# Patient Record
Sex: MALE | Race: WHITE | Employment: FULL TIME | URBAN - METROPOLITAN AREA
[De-identification: names, ages, dates, MRNs, and addresses within clinical notes are randomized per-mention and may not be internally consistent; named-entity substitution may affect disease eponyms.]

---

## 2018-11-04 ENCOUNTER — HOSPITAL ENCOUNTER (EMERGENCY)
Age: 28
Discharge: LEFT BEFORE DISCHARGE | End: 2018-11-04

## 2018-11-04 VITALS
BODY MASS INDEX: 26.6 KG/M2 | WEIGHT: 190 LBS | HEIGHT: 71 IN | TEMPERATURE: 98.2 F | SYSTOLIC BLOOD PRESSURE: 118 MMHG | DIASTOLIC BLOOD PRESSURE: 63 MMHG | OXYGEN SATURATION: 99 % | HEART RATE: 93 BPM | RESPIRATION RATE: 14 BRPM

## 2021-02-04 ENCOUNTER — APPOINTMENT (OUTPATIENT)
Dept: GENERAL RADIOLOGY | Facility: CLINIC | Age: 31
End: 2021-02-04

## 2021-02-04 ENCOUNTER — HOSPITAL ENCOUNTER (EMERGENCY)
Facility: CLINIC | Age: 31
Discharge: HOME | End: 2021-02-04
Attending: EMERGENCY MEDICINE

## 2021-02-04 VITALS
TEMPERATURE: 97.9 F | RESPIRATION RATE: 14 BRPM | HEART RATE: 80 BPM | SYSTOLIC BLOOD PRESSURE: 130 MMHG | HEIGHT: 71 IN | WEIGHT: 200 LBS | BODY MASS INDEX: 28 KG/M2 | OXYGEN SATURATION: 98 % | DIASTOLIC BLOOD PRESSURE: 88 MMHG

## 2021-02-04 DIAGNOSIS — M79.605 LEFT LEG PAIN: Primary | ICD-10-CM

## 2021-02-04 PROCEDURE — 73590 X-RAY EXAM OF LOWER LEG: CPT

## 2021-02-04 PROCEDURE — 6370000000 HC RX 637 (ALT 250 FOR IP): Performed by: EMERGENCY MEDICINE

## 2021-02-04 PROCEDURE — 99284 EMERGENCY DEPT VISIT MOD MDM: CPT

## 2021-02-04 RX ORDER — IBUPROFEN 800 MG/1
800 TABLET ORAL ONCE
Status: COMPLETED | OUTPATIENT
Start: 2021-02-04 | End: 2021-02-04

## 2021-02-04 RX ORDER — IBUPROFEN 800 MG/1
800 TABLET ORAL EVERY 8 HOURS PRN
Qty: 30 TABLET | Refills: 0 | Status: SHIPPED | OUTPATIENT
Start: 2021-02-04

## 2021-02-04 RX ADMIN — IBUPROFEN 800 MG: 800 TABLET, FILM COATED ORAL at 03:53

## 2021-02-04 ASSESSMENT — PAIN DESCRIPTION - FREQUENCY: FREQUENCY: INTERMITTENT

## 2021-02-04 ASSESSMENT — PAIN SCALES - GENERAL: PAINLEVEL_OUTOF10: 7

## 2021-02-04 ASSESSMENT — PAIN DESCRIPTION - LOCATION: LOCATION: LEG

## 2021-02-04 NOTE — ED PROVIDER NOTES
eMERGENCY dEPARTMENT eNCOUnter      Pt Name: Pina Castano  MRN: [id number]  [place] 1990  Date of evaluation: 2/4/2021      CHIEF COMPLAINT       Chief Complaint   Patient presents with    Leg Pain     left         HISTORY OF PRESENT ILLNESS    Pina Castano is a 26 y.o. male who presents leg pain. Patient states approximately 2-1/2 months ago some and stepped on the anterior shin of his left lower leg he states he has been having pain since that time says he is having pain on the lateral aspect for the last 1-1/2 weeks he is here today to figure out what is going on he has not taken anything for his pain he is ambulating without difficulty        REVIEW OF SYSTEMS       Positive for leg pain negative for numbness tingling or weakness    PAST MEDICAL HISTORY    has no past medical history on file. SURGICAL HISTORY      has no past surgical history on file. CURRENT MEDICATIONS       Discharge Medication List as of 2/4/2021  3:49 AM          ALLERGIES     has No Known Allergies. FAMILY HISTORY     has no family status information on file. family history is not on file. SOCIAL HISTORY      reports that he has been smoking cigarettes. He has a 3.50 pack-year smoking history. He has never used smokeless tobacco. He reports that he does not drink alcohol or use drugs. PHYSICAL EXAM     INITIAL VITALS:  height is 5' 11\" (1.803 m) and weight is 90.7 kg (200 lb). His oral temperature is 97.9 °F (36.6 °C). His blood pressure is 130/88 and his pulse is 80. His respiration is 14 and oxygen saturation is 98%.       General: Patient is alert nontoxic-appearing male in no apparent distress  HEENT: Head is atraumatic  Respiratory: Patient has nonlabored respirations  Extremity: Examination left lower extremity reveals no gross deformity swelling or ecchymosis he has minimal reproducible pain in the muscular area specially in the lateral aspect of the calf no calf tenderness posterior no cords negative Homans' sign no swelling good distal pulses no erythema or warmth    DIFFERENTIAL DIAGNOSIS/ MDM:     Leg pain will obtain x-ray    DIAGNOSTIC RESULTS       RADIOLOGY:   I directly visualized the following  images and reviewed the radiologist interpretations:  XR TIBIA FIBULA LEFT (2 VIEWS)   Final Result   No acute osseous or soft tissue abnormality. LABS:  Labs Reviewed - No data to display      EMERGENCY DEPARTMENT COURSE:   Vitals:    Vitals:    02/04/21 0320   BP: 130/88   Pulse: 80   Resp: 14   Temp: 97.9 °F (36.6 °C)   TempSrc: Oral   SpO2: 98%   Weight: 90.7 kg (200 lb)   Height: 5' 11\" (1.803 m)     -------------------------  BP: 130/88, Temp: 97.9 °F (36.6 °C), Pulse: 80, Resp: 14    Orders Placed This Encounter   Medications    ibuprofen (ADVIL;MOTRIN) tablet 800 mg    ibuprofen (ADVIL;MOTRIN) 800 MG tablet     Sig: Take 1 tablet by mouth every 8 hours as needed for Pain     Dispense:  30 tablet     Refill:  0           Re-evaluation Notes    X-ray per radiologist shows nothing acute at this time there is no indications of acute bony abnormalities infectious process no indication DVT he will be treated with Motrin follow-up as directed return if worse        FINAL IMPRESSION      1.  Left leg pain          DISPOSITION/PLAN   DISPOSITION Decision To Discharge 02/04/2021 03:48:49 AM      Condition on Disposition    Stable    PATIENT REFERRED TO:  Chica Villegas MD  [facility]  [address]  [phone]      As needed      DISCHARGE MEDICATIONS:  Discharge Medication List as of 2/4/2021  3:49 AM      START taking these medications    Details   ibuprofen (ADVIL;MOTRIN) 800 MG tablet Take 1 tablet by mouth every 8 hours as needed for Pain, Disp-30 tablet, R-0Normal             (Please note that portions of this note were completed with a voice recognition program.  Efforts were made to edit the dictations but occasionally words are mis-transcribed.)    [place] David Dallas MD, F.A.C.E.P.   Attending Emergency Physician        Helena Chambers MD  02/04/21 8950

## 2021-02-04 NOTE — ED NOTES
Mode of arrival (squad #, walk in, police, etc) : walk in from home        Chief complaint(s): left leg pain        Arrival Note (brief scenario, treatment PTA, etc). : Pt presented to the ED c/o left leg pain for the past 2 months. Pt states his friend stepped on his leg at the time. Pt states he has just been trying to walk it off since then. Pt states the pain gotten progressively worse two weeks ago. Pt states that he is noticing more bruising on his left leg. Pt denies taking any pain medication at home. Pt able to ambulate with steady gait. C= \"Have you ever felt that you should Cut down on your drinking? \"  No  A= \"Have people Annoyed you by criticizing your drinking? \"  No  G= \"Have you ever felt bad or Guilty about your drinking? \"  No  E= \"Have you ever had a drink as an Eye-opener first thing in the morning to steady your nerves or to help a hangover? \"  No      Deferred []      Reason for deferring: N/A    *If yes to two or more: probable alcohol abuse. Arik Ocasio RN  02/04/21 1923

## 2021-10-25 ENCOUNTER — HOSPITAL ENCOUNTER (EMERGENCY)
Facility: CLINIC | Age: 31
Discharge: HOME | End: 2021-10-25
Attending: EMERGENCY MEDICINE

## 2021-10-25 VITALS
TEMPERATURE: 98 F | RESPIRATION RATE: 18 BRPM | OXYGEN SATURATION: 99 % | WEIGHT: 184 LBS | DIASTOLIC BLOOD PRESSURE: 95 MMHG | HEIGHT: 71 IN | BODY MASS INDEX: 25.76 KG/M2 | SYSTOLIC BLOOD PRESSURE: 152 MMHG | HEART RATE: 79 BPM

## 2021-10-25 DIAGNOSIS — L72.3 SEBACEOUS CYST: Primary | ICD-10-CM

## 2021-10-25 PROCEDURE — 99282 EMERGENCY DEPT VISIT SF MDM: CPT

## 2021-10-25 NOTE — ED PROVIDER NOTES
[address] ED  EMERGENCY DEPARTMENT ENCOUNTER      Pt Name: Zulmea Palomino  MRN: [id number]  [place] 1990  Date of evaluation: 10/25/2021  Provider: Pete Hawkins MD    CHIEF COMPLAINT     Chief Complaint   Patient presents with    Groin Swelling         HISTORY OF PRESENT ILLNESS   (Location/Symptom, Timing/Onset, Context/Setting,Quality, Duration, Modifying Factors, Severity)  Note limiting factors. Zulema Palomino is a 33 y.o. male who presents to the emergency department stating he noticed a lump on the shaft of his penis couple weeks ago. Earlier than that he had another lump that became bigger then had a discharge and then pretty much resolved. He denies urethral discharge or dysuria. The history is provided by the patient. Nursing Notes werereviewed. REVIEW OF SYSTEMS    (2-9 systems for level 4, 10 or more for level 5)     Review of Systems   All other systems reviewed and are negative. Except as noted above the remainder of the review of systems was reviewed and negative. PAST MEDICAL HISTORY   No past medical history on file. SURGICALHISTORY     No past surgical history on file. CURRENT MEDICATIONS       Discharge Medication List as of 10/25/2021  8:24 AM      CONTINUE these medications which have NOT CHANGED    Details   ibuprofen (ADVIL;MOTRIN) 800 MG tablet Take 1 tablet by mouth every 8 hours as needed for Pain, Disp-30 tablet, R-0Normal             ALLERGIES     Patient has no known allergies. FAMILY HISTORY     No family history on file.        SOCIAL HISTORY       Social History     Socioeconomic History    Marital status: Single     Spouse name: Not on file    Number of children: Not on file    Years of education: Not on file    Highest education level: Not on file   Occupational History    Not on file   Tobacco Use    Smoking status: Current Every Day Smoker     Packs/day: 0.50     Years: 7.00     Pack years: 3.50     Types: Cigarettes    Smokeless tobacco: Never Used   Vaping Use    Vaping Use: Never used   Substance and Sexual Activity    Alcohol use: No    Drug use: No    Sexual activity: Yes   Other Topics Concern    Not on file   Social History Narrative    Not on file     Social Determinants of Health     Financial Resource Strain:     Difficulty of Paying Living Expenses:    Food Insecurity:     Worried About Running Out of Food in the Last Year:     [address] in the Last Year:    Transportation Needs:     Lack of Transportation (Medical):  Lack of Transportation (Non-Medical):    Physical Activity:     Days of Exercise per Week:     Minutes of Exercise per Session:    Stress:     Feeling of Stress :    Social Connections:     Frequency of Communication with Friends and Family:     Frequency of Social Gatherings with Friends and Family:     Attends [employer] Services:     Active Member of Clubs or Organizations:     Attends Club or Organization Meetings:     Marital Status:    Intimate Partner Violence:     Fear of Current or Ex-Partner:     Emotionally Abused:     Physically Abused:     Sexually Abused:        SCREENINGS             PHYSICAL EXAM    (up to 7 for level 4, 8 or more for level 5)     ED Triage Vitals   BP Temp Temp Source Pulse Resp SpO2 Height Weight   10/25/21 0802 10/25/21 0804 10/25/21 0804 10/25/21 0802 10/25/21 0802 10/25/21 0802 10/25/21 0802 10/25/21 0802   (!) 152/95 98 °F (36.7 °C) Oral 79 18 99 % 5' 11\" (1.803 m) 184 lb (83.5 kg)       Physical Exam  Vitals reviewed. Constitutional:       General: He is not in acute distress. Genitourinary:     Comments: Patient does not have groin adenopathy. On the ventral surface of the penile shaft pretty much in the midline there is a small sebaceous cyst with a central punctum. This is barely half centimeter in diameter and is not tender or fluctuant. There is no overlying skin redness or warmth.   There is no urethral discharge. The rest of the external genitalia are unremarkable and nontender. Skin:     General: Skin is warm and dry. Neurological:      Mental Status: He is alert. DIAGNOSTIC RESULTS     EKG: All EKG's are interpreted by the Emergency Department Physician who either signs orCo-signs this chart in the absence of a cardiologist.    RADIOLOGY:     Interpretation per the Radiologist below, ifavailable at the time of this note:    No orders to display         ED BEDSIDE ULTRASOUND:   Performed by ED Physician - none    LABS:  Labs Reviewed - No data to display    All other labs were within normal range ornot returned as of this dictation. EMERGENCY DEPARTMENT COURSE and DIFFERENTIAL DIAGNOSIS/MDM:   Vitals:    Vitals:    10/25/21 0802 10/25/21 0804   BP: (!) 152/95    Pulse: 79    Resp: 18    Temp:  98 °F (36.7 °C)   TempSrc:  Oral   SpO2: 99%    Weight: 83.5 kg (184 lb)    Height: 5' 11\" (1.803 m)             Patient has required a lot of reassurance about the benign nature of this condition. He was advised to seek urology evaluation if it becomes larger or starts looking infected. MDM    CONSULTS:  None    PROCEDURES:  Unlessotherwise noted below, none     Procedures    FINAL IMPRESSION      1. Sebaceous cyst          DISPOSITION/PLAN   DISPOSITION Decision To Discharge 10/25/2021 08:23:50 AM      PATIENT REFERRED TO:  [id number] [employer] [address] [place] [id number]    As needed      DISCHARGE MEDICATIONS:         Problem List:  There is no problem list on file for this patient. Summation      Patient Course: Discharged. ED Medicationsadministered this visit:  Medications - No data to display    New Prescriptions from this visit:    Discharge Medication List as of 10/25/2021  8:24 AM          Follow-up:  [id number] [employer] [address] [place] [phone]    As needed        Final Impression:   1.  Sebaceous cyst               (Please note that portions of this note were completed with a voice recognitionprogram.  Efforts were made to edit the dictations but occasionally words are mis-transcribed.)    Monica Wang MD (electronically signed)  Attending Emergency Physician            Monica Wang MD  10/25/21 0174

## 2022-01-12 ENCOUNTER — HOSPITAL ENCOUNTER (EMERGENCY)
Age: 32
Discharge: HOME | End: 2022-01-12
Attending: EMERGENCY MEDICINE

## 2022-01-12 ENCOUNTER — HOSPITAL ENCOUNTER (EMERGENCY)
Facility: CLINIC | Age: 32
Discharge: ANOTHER HOSPITAL | End: 2022-01-12
Attending: EMERGENCY MEDICINE

## 2022-01-12 VITALS
RESPIRATION RATE: 18 BRPM | HEART RATE: 92 BPM | OXYGEN SATURATION: 98 % | TEMPERATURE: 98.4 F | WEIGHT: 200 LBS | SYSTOLIC BLOOD PRESSURE: 145 MMHG | BODY MASS INDEX: 28 KG/M2 | DIASTOLIC BLOOD PRESSURE: 98 MMHG | HEIGHT: 71 IN

## 2022-01-12 VITALS
HEART RATE: 80 BPM | HEIGHT: 71 IN | OXYGEN SATURATION: 98 % | DIASTOLIC BLOOD PRESSURE: 88 MMHG | RESPIRATION RATE: 18 BRPM | TEMPERATURE: 98.5 F | WEIGHT: 200 LBS | BODY MASS INDEX: 28 KG/M2 | SYSTOLIC BLOOD PRESSURE: 120 MMHG

## 2022-01-12 DIAGNOSIS — L02.91 ABSCESS: Primary | ICD-10-CM

## 2022-01-12 DIAGNOSIS — N48.21 ABSCESS OF SHAFT OF PENIS: Primary | ICD-10-CM

## 2022-01-12 PROCEDURE — 6370000000 HC RX 637 (ALT 250 FOR IP): Performed by: EMERGENCY MEDICINE

## 2022-01-12 PROCEDURE — 6370000000 HC RX 637 (ALT 250 FOR IP): Performed by: NURSE PRACTITIONER

## 2022-01-12 PROCEDURE — 99285 EMERGENCY DEPT VISIT HI MDM: CPT

## 2022-01-12 PROCEDURE — 2500000003 HC RX 250 WO HCPCS: Performed by: PHYSICIAN ASSISTANT

## 2022-01-12 PROCEDURE — 99283 EMERGENCY DEPT VISIT LOW MDM: CPT

## 2022-01-12 PROCEDURE — 10060 I&D ABSCESS SIMPLE/SINGLE: CPT

## 2022-01-12 RX ORDER — IBUPROFEN 800 MG/1
800 TABLET ORAL ONCE
Status: COMPLETED | OUTPATIENT
Start: 2022-01-12 | End: 2022-01-12

## 2022-01-12 RX ORDER — LIDOCAINE HYDROCHLORIDE 10 MG/ML
INJECTION, SOLUTION INFILTRATION; PERINEURAL
Status: DISCONTINUED
Start: 2022-01-12 | End: 2022-01-12 | Stop reason: HOSPADM

## 2022-01-12 RX ORDER — OXYCODONE HYDROCHLORIDE AND ACETAMINOPHEN 5; 325 MG/1; MG/1
2 TABLET ORAL ONCE
Status: COMPLETED | OUTPATIENT
Start: 2022-01-12 | End: 2022-01-12

## 2022-01-12 RX ORDER — DOXYCYCLINE HYCLATE 100 MG
100 TABLET ORAL ONCE
Status: DISCONTINUED | OUTPATIENT
Start: 2022-01-12 | End: 2022-01-12 | Stop reason: CLARIF

## 2022-01-12 RX ORDER — DOXYCYCLINE HYCLATE 100 MG
100 TABLET ORAL 2 TIMES DAILY
Qty: 20 TABLET | Refills: 0 | Status: SHIPPED | OUTPATIENT
Start: 2022-01-12 | End: 2022-01-22

## 2022-01-12 RX ORDER — DOXYCYCLINE 100 MG/1
100 CAPSULE ORAL ONCE
Status: COMPLETED | OUTPATIENT
Start: 2022-01-12 | End: 2022-01-12

## 2022-01-12 RX ORDER — OXYCODONE HYDROCHLORIDE AND ACETAMINOPHEN 5; 325 MG/1; MG/1
1-2 TABLET ORAL EVERY 6 HOURS PRN
Qty: 10 TABLET | Refills: 0 | Status: SHIPPED | OUTPATIENT
Start: 2022-01-12 | End: 2022-01-15

## 2022-01-12 RX ORDER — LIDOCAINE HYDROCHLORIDE 10 MG/ML
5 INJECTION, SOLUTION EPIDURAL; INFILTRATION; INTRACAUDAL; PERINEURAL ONCE
Status: COMPLETED | OUTPATIENT
Start: 2022-01-12 | End: 2022-01-12

## 2022-01-12 RX ADMIN — OXYCODONE HYDROCHLORIDE AND ACETAMINOPHEN 2 TABLET: 5; 325 TABLET ORAL at 08:24

## 2022-01-12 RX ADMIN — DOXYCYCLINE 100 MG: 100 CAPSULE ORAL at 06:08

## 2022-01-12 RX ADMIN — IBUPROFEN 800 MG: 800 TABLET, FILM COATED ORAL at 06:08

## 2022-01-12 RX ADMIN — LIDOCAINE HYDROCHLORIDE 5 ML: 10 INJECTION, SOLUTION EPIDURAL; INFILTRATION; INTRACAUDAL; PERINEURAL at 09:40

## 2022-01-12 ASSESSMENT — ENCOUNTER SYMPTOMS
SHORTNESS OF BREATH: 0
COUGH: 0

## 2022-01-12 ASSESSMENT — PAIN SCALES - GENERAL
PAINLEVEL_OUTOF10: 8
PAINLEVEL_OUTOF10: 9
PAINLEVEL_OUTOF10: 5
PAINLEVEL_OUTOF10: 10

## 2022-01-12 ASSESSMENT — PAIN DESCRIPTION - LOCATION: LOCATION: PENIS

## 2022-01-12 ASSESSMENT — PAIN DESCRIPTION - DESCRIPTORS: DESCRIPTORS: ACHING

## 2022-01-12 ASSESSMENT — PAIN DESCRIPTION - PAIN TYPE: TYPE: ACUTE PAIN

## 2022-01-12 NOTE — ED PROVIDER NOTES
[facility]  [facility]  Phone: [phone]      Pt Name: Samuel Johnson  QKP:[id number]  [place] 1990  Date of evaluation: 1/12/2022      CHIEF COMPLAINT       Chief Complaint   Patient presents with    Abscess     groin       HISTORY OF PRESENT ILLNESS   Samuel Johnson is a 33 y.o. male who presents for evaluation of a painful lesion to the shaft of his penis. The patient reports that starting approximately 3 months ago he noticed a small, nonpainful, bump on the underside shaft of his penis. He states that he was evaluated here in the emergency department on 10/25/2021 and at that time it was thought to be a sebaceous cyst.  He was instructed to apply warm compresses and allow the cyst to drain naturally. The patient has been applying warm compresses and states that at times he has drained a purulent white material but over the past week his it seemed to get bigger and bigger with associated pain, redness, and swelling. The patient states that it is no longer draining. He has not followed up with a PCP because he does not currently have insurance. He does not have an established urologist.  His tetanus shot is up-to-date. The patient has not taken any medications for pain and does not list any palliating factors. His pain is worse with movement. The patient denies fever, chills, headache, vision changes, neck pain, back pain, chest pain, shortness of breath, abdominal pain, urinary/bowel symptoms, penile drainage, testicular pain, focal weakness, numbness, tingling, recent injury or illness. REVIEW OF SYSTEMS     Ten point review of systems was reviewed and is negative unless otherwise noted in the HPI    [address]    has no past medical history on file. The patient denies    SURGICAL HISTORY      has no past surgical history on file.   The patient denies    CURRENT MEDICATIONS       Previous Medications    IBUPROFEN (ADVIL;MOTRIN) 800 MG TABLET    Take 1 tablet by mouth every 8 hours as needed for Pain       ALLERGIES     has No Known Allergies. FAMILY HISTORY     has no family status information on file. family history is not on file. SOCIAL HISTORY      reports that he has been smoking cigarettes. He has a 3.50 pack-year smoking history. He has never used smokeless tobacco. He reports that he does not drink alcohol and does not use drugs. PHYSICAL EXAM     INITIAL VITALS:  height is 5' 11\" (1.803 m) and weight is 90.7 kg (200 lb). His oral temperature is 98.4 °F (36.9 °C). His blood pressure is 145/98 (abnormal) and his pulse is 92. His respiration is 18 and oxygen saturation is 98%. CONSTITUTIONAL: no apparent distress, well appearing  SKIN: There is an approximately 2 cm x 2 cm indurated, fluctuant, tender, erythematous abscess to the ventral shaft of the penis without any lymphangitic streaking or active drainage. No lymphadenopathy. Skin otherwise warm, dry, no jaundice, hives or petechiae  EYES: clear conjunctiva, non-icteric sclera  HENT: normocephalic, atraumatic, moist mucus membranes  NECK: Nontender and supple with no nuchal rigidity, full range of motion  PULMONARY: clear to auscultation without wheezes, rhonchi, or rales, normal excursion, no accessory muscle use and no stridor  CARDIOVASCULAR: regular rate, rhythm. Strong radial pulses with intact distal perfusion. Capillary refill <2 seconds. GASTROINTESTINAL: soft, non-tender, non-distended, no palpable masses, no rebound or guarding   GENITOURINARY: No costovertebral angle tenderness to palpation. External genitalia normal, no urethral discharge, tested descended bilaterally. No lesions noted on the scrotum. Epididymus normal bilaterally. No hernia palpable. MUSCULOSKELETAL: No midline spinal tenderness, step off or deformity. Extremities are otherwise nontender to palpation and nonerythematous. Compartments soft. No peripheral edema.   NEUROLOGIC: alert and oriented x 3, GCS 15, normal mentation and speech. Moves all extremities x 4 without motor or sensory deficit, gait is stable without ataxia  PSYCHIATRIC: normal mood and affect, thought process is clear and linear    DIAGNOSTIC RESULTS     EKG:  None    RADIOLOGY:   No results found. LABS:  No results found for this visit on 01/12/22. EMERGENCY DEPARTMENT COURSE:        The patient was given the following medications:  Orders Placed This Encounter   Medications    DISCONTD: doxycycline hyclate (VIBRA-TABS) tablet 100 mg     Order Specific Question:   Antimicrobial Indications     Answer:   Skin and Soft Tissue Infection    ibuprofen (ADVIL;MOTRIN) tablet 800 mg    doxycycline monohydrate (MONODOX) capsule 100 mg     Order Specific Question:   Antimicrobial Indications     Answer:   Skin and Soft Tissue Infection        Vitals:    Vitals:    01/12/22 0541 01/12/22 0653   BP: (!) 145/98    Pulse: 101 92   Resp: 18    Temp: 98.4 °F (36.9 °C)    TempSrc: Oral    SpO2: 98%    Weight: 90.7 kg (200 lb)    Height: 5' 11\" (1.803 m)      -------------------------  BP: (!) 145/98, Temp: 98.4 °F (36.9 °C), Pulse: 92, Resp: 18    CONSULTS:  None    CRITICAL CARE:   None    PROCEDURES:  None    DIAGNOSIS/ MDM:   Paulino Severs is a 33 y.o. male who presents with a painful abscess to the shaft of his penis. Based on chart review and patient history he likely had a painless cyst on the shaft of his penis for months before it recently became infected. The abscesses erythematous, indurated, fluctuant and tender. There is no active drainage. Initial heart rate was slightly elevated but I suspect this secondary to pain and anxiety. He was treated with ibuprofen and heart rate improved to normal.  Vital signs are otherwise stable. I did start him on doxycycline while waiting to speak with urology on-call.   I spoke to Dr. Caitlin Ley at [facility] and he suspects that the abscess has embedded into underlying structures and he will need urologic surgery to have this removed. He recommended that the patient be transferred over to [facility] emergency department by private auto and have the urology team evaluate him. The patient is otherwise hemodynamically stable. He is agreeable with plan. I spoke to the emergency physician at [facility], Dr. Meaghan Johnson, at 6:56am who agrees to accept the patient for transfer. The patient will go by private auto. FINAL IMPRESSION      1.  Abscess of shaft of penis          DISPOSITION/PLAN   DISPOSITION          PATIENT REFERRED TO:  [address] [facility]  [address]  [facility] [id number]  Go to   [address] [facility] [address]:  New Prescriptions    No medications on file       (Please note that portions of this note were completed with a voice recognitionprogram.  Efforts were made to edit the dictations but occasionally words are mis-transcribed.)    Tien Danielle DO, [facility]  Emergency Physician Attending         Tien Danielle DO  01/12/22 2391

## 2022-01-12 NOTE — CONSULTS
Ananya Christensen, [place] Beavers, Scotty Worley, & Guido  Urology Consultation      Patient:  Coni Vila  MRN: [id number]  YOB: 1990    CHIEF COMPLAINT:  Penile Abscess    HISTORY OF PRESENT ILLNESS:   The patient is a 33 y.o. male who presents with abscess that started as a cyst about 3 months ago. He states he reported to ED in October 2021 for a painless bump on his penile shaft and diagnosed with a cyst - no drainage and no antibiotics given at that time. He then states the spot never went away, but was not bothering him, so he did not follow up with urology as directed. He then reports that the lesion became painful over the last week or so, started draining pustular fluid, and he returned to [address] [place] [address],[address] ED this morning as it stopped draining and the pain worsened. He was told to transfer here for urologic evaluation. He was given one dose of doxycycline this morning. He has no medical history, denies any fevers, chills, or other similar abscesses on his body. He also denies any history of STDs, marijuana or other drug use. Patient's old records, notes and chart reviewed and summarized above. Past Medical History:    History reviewed. No pertinent past medical history. Past Surgical History:    History reviewed. No pertinent surgical history.     Medications:      Current Facility-Administered Medications:     lidocaine PF 1 % injection 5 mL, 5 mL, IntraDERmal, Once, Radha Billingsley PA-C    Current Outpatient Medications:     ibuprofen (ADVIL;MOTRIN) 800 MG tablet, Take 1 tablet by mouth every 8 hours as needed for Pain, Disp: 30 tablet, Rfl: 0    Allergies:    No Known Allergies    Social History:   Social History     Socioeconomic History    Marital status: Single     Spouse name: Not on file    Number of children: Not on file    Years of education: Not on file    Highest education level: Not on file   Occupational History    Not on file   Tobacco Use    Smoking status: Current Every Day Smoker     Packs/day: 0.50     Years: 7.00     Pack years: 3.50     Types: Cigarettes    Smokeless tobacco: Never Used   Vaping Use    Vaping Use: Never used   Substance and Sexual Activity    Alcohol use: No    Drug use: No    Sexual activity: Yes   Other Topics Concern    Not on file   Social History Narrative    Not on file     Social Determinants of Health     Financial Resource Strain:     Difficulty of Paying Living Expenses: Not on file   Food Insecurity:     Worried About Running Out of Food in the Last Year: Not on file    Jayda of Food in the Last Year: Not on file   Transportation Needs:     Lack of Transportation (Medical): Not on file    Lack of Transportation (Non-Medical): Not on file   Physical Activity:     Days of Exercise per Week: Not on file    Minutes of Exercise per Session: Not on file   Stress:     Feeling of Stress : Not on file   Social Connections:     Frequency of Communication with Friends and Family: Not on file    Frequency of Social Gatherings with Friends and Family: Not on file    Attends Bahai Services: Not on file    Active Member of [address] [place] [address] or Organizations: Not on file    Attends Club or Organization Meetings: Not on file    Marital Status: Not on file   Intimate Partner Violence:     Fear of Current or Ex-Partner: Not on file    Emotionally Abused: Not on file    Physically Abused: Not on file    Sexually Abused: Not on file   Housing Stability:     Unable to Pay for Housing in the Last Year: Not on file    Number of [place] in the Last Year: Not on file    Unstable Housing in the Last Year: Not on file       Family History:    History reviewed. No pertinent family history. REVIEW OF SYSTEMS:  A comprehensive 14 point review of systems was obtained.   Constitutional: No fatigue  Eyes: No blurry vision  Ears, nose, mouth, throat, face: No ringing in the ears; no facial droop  Respiratory: No cough or cold  Cardiovascular: No palpitations  Gastrointestinal: No diarrhea or constipation  Genitourinary: See HPI  Integument/Skin: No rashes. + abscess on penis  Hematologic/Lymphatic: No easy bruising  Musculoskeletal: No muscle pains  Neurologic: No weakness in the extremities  Psychiatric: No depression or suicidal thoughts  Endocrine: No heat or cold intolerances  Allergic/Immunologic: No current seasonal allergies; no skin hives      Physical Exam:    This a 33 y.o. male   Vitals:    01/12/22 0759   BP: (!) 128/91   Pulse: 82   Resp: 18   Temp: 98.5 °F (36.9 °C)   SpO2: 98%       Constitutional: Patient in no acute distress  Neuro: Alert and oriented to person place and time  Psych: Mood and affect normal  Head: Atraumatic and normocephalic  Neck: Trachea midline  Lungs: Respiratory effort normal  Cardiovascular:  Regular rhythm  Abdomen: Soft, non-tender, non-distended. CVA tenderness   Ext: 2+ DP pulses bilaterally  Skin: No rashes or bruising present  Bladder: Non-tender and not distended  Lymphatics: No palpable lymphadenopathy    :  Penis normal and circumcised. Small tender abscess noted on ventral aspect of penile shaft. Small fluctuant center. No active drainage. No inguinal lymphadenopathy. Urethral meatus normal  Scrotal exam normal  Testicles normal bilaterally      Labs:  No results for input(s): WBC, HGB, HCT, MCV, PLT in the last 72 hours. No results for input(s): NA, K, CL, CO2, PHOS, BUN, CREATININE, CA in the last 72 hours. No results for input(s): COLORU, PHUR, LABCAST, WBCUA, RBCUA, MUCUS, TRICHOMONAS, YEAST, BACTERIA, CLARITYU, SPECGRAV, LEUKOCYTESUR, UROBILINOGEN, Marita Early in the last 72 hours. Invalid input(s): NITRATE, GLUCOSEUKETONESUAMORPHOUS    Culture Results:  -----------------------------------------------------------------  Imaging Results:  No results found.     Assessment and Plan   Impression:  32 yo male with Penile Abscess    There is no problem list on file for this patient. Plan:   -Bedside I&D was preformed under sterile technique. Site was cleaned with betadine. Sterile towels applied. 2.5 mL total of 1% lidocaine without epinephrine injected intradermally. #10 scalpel used to make small, <1cm incision. Pustular contents were completely expressed from incision. Bacitracin and gauze dressing applied. Patient tolerated procedure well.   -Discharge home on 10 days of Doxycycline 100mg BID sent to pharmacy  -Follow up with urology clinic in 1-2 weeks for re-evaluation of wound. Message sent to our clinic. Thank you for involving us in the care of Lun[place]. Should you have any questions, please do not hesitate to contact us at any time.       Amari Dejesus,  Urology Service   9:29 AM 1/12/2022

## 2022-01-12 NOTE — ED TRIAGE NOTES
Pt seen at [facility] this morning for a cyst on the penis. Pt was told to come her to have the cyst drained.  Pt took one dose of antibiotics this morning. (doxycycline)

## 2022-01-12 NOTE — ED PROVIDER NOTES
[address]  ED  EMERGENCY DEPARTMENT ENCOUNTER      Pt Name: Rachael Lombard  MRN: [id number]  [place] 1990  Date of evaluation: 1/12/22  PCP:  No primary care provider on file. CHIEF COMPLAINT:   Chief Complaint   Patient presents with    Abscess     Pt was seen at [facility] yesterday for a abscess and they told him to come here      Denisa is a 33 y.o. male whopresents with abscess to ventral side of penis. He states he has had a \"cyst\" there for multiple months and became red and painful and enlarged prompting [facility] and transferred here for urology consultation. Denies fevers chills or any systemic symptoms no history of STDs or diabetes        REVIEW OF SYSTEMS       Review of Systems   Constitutional: Negative for chills and fever. Respiratory: Negative for cough and shortness of breath. Cardiovascular: Negative for chest pain. PAST MEDICAL HISTORY   PMH:  has no past medical history on file. SurgicalHistory:  has no past surgical history on file. Social History:  reports that he has been smoking cigarettes. He has a 3.50 pack-year smoking history. He has never used smokeless tobacco. He reports that he does not drink alcohol and does not use drugs. Family History: Noncontributory at this time  Psychiatric History: Noncontributory at this time    Allergies:has No Known Allergies. PHYSICAL EXAM     INITIAL VITALS: /88   Pulse 80   Temp 98.5 °F (36.9 °C) (Oral)   Resp 18   Ht 5' 11\" (1.803 m)   Wt 200 lb (90.7 kg)   SpO2 98%   BMI 27.89 kg/m²      Physical Exam  Constitutional:       General: He is not in acute distress. Appearance: He is well-developed. He is not diaphoretic. HENT:      Head: Normocephalic and atraumatic. Eyes:      General: No scleral icterus. Right eye: No discharge. Left eye: No discharge. Conjunctiva/sclera: Conjunctivae normal.      Pupils: Pupils are equal, round, and reactive to light. Cardiovascular:      Rate and Rhythm: Normal rate and regular rhythm. Heart sounds: Normal heart sounds. No murmur heard. No friction rub. No gallop. Pulmonary:      Effort: Pulmonary effort is normal. No respiratory distress. Breath sounds: Normal breath sounds. No wheezing or rales. Abdominal:      General: There is no distension. Palpations: Abdomen is soft. There is no mass. Tenderness: There is no abdominal tenderness. There is no guarding or rebound. Hernia: No hernia is present. Musculoskeletal:         General: Normal range of motion. Cervical back: Normal range of motion. Skin:     General: Skin is warm. Findings: No rash. Neurological:      Mental Status: He is alert and oriented to person, place, and time. Psychiatric:         Behavior: Behavior normal.       There is a 1 x 1 cm area of induration and swelling with no drainage to the ventral side of the penis. EMERGENCY DEPARTMENT COURSE:     LABS: Labs reviewed by myselfshow:   Labs Reviewed - No data to display       EKG: All EKG's are interpreted by the Emergency Department Physician who either signs or Co-signs this chart inthe absence of a cardiologist.      RADIOLOGY:    No orders to display         CONSULTS:  [address]    MDM:    Given location will call urology for drainage. He is otherwise afebrile nontoxic    This was drained by urology tolerated procedure well will send home on antibiotics and pain meds and follow-up with urology as an outpatient    FINAL IMPRESSION:     1.  Abscess          DISPOSITION:  DISPOSITION        PATIENT REFERRED TO:  Latisha Olivares MD  [address] [facility] [address] [place] [address]  [phone]    In 1 week  CALL UROLOGY CLINIC TO CONFIRM DATE AND TIME OF APPOINTMENT    [facility] ED  [address]  [address] [address] [phone]    If symptoms worsen      DISCHARGEMEDICATIONS:  Discharge Medication List as of 1/12/2022 10:23 AM      START taking these medications    Details   doxycycline hyclate (VIBRA-TABS) 100 MG tablet Take 1 tablet by mouth 2 times daily for 10 days, Disp-20 tablet, R-0Normal      oxyCODONE-acetaminophen (PERCOCET) 5-325 MG per tablet Take 1-2 tablets by mouth every 6 hours as needed for Pain for up to 3 days. WARNING:  May cause drowsiness. May impair ability to operate vehicles or machinery. Do not use in combination with alcohol., Disp-10 tablet, R-0Print             (Please note that portions of this note were completed with a voice recognition program.  Efforts were made to edit thedictations but occasionally words are mis-transcribed. )    Meghan Navarro MD Attending Emergency Medicine Physician          Quintin Mancia MD  01/12/22 6051

## 2022-01-12 NOTE — ED NOTES
[facility] notified of  Need for consult with Urology.  paged.      Bev Moncada, RN  01/12/22 1915 Kathy Granado RN  01/12/22 1914

## 2022-01-12 NOTE — ED NOTES
Pt presents to ED via private auto with c/o groin pain. Pt states he has a cyst on penis. Pt states he was here two months ago for same complaint. Pt denies any drainage. Pt denies pain with urination. Pt able to ambulate without assist. Pt afebrile, vitals stable.       Chiara Larsen RN  01/12/22 0875

## 2022-01-13 ENCOUNTER — TELEPHONE (OUTPATIENT)
Dept: UROLOGY | Age: 32
End: 2022-01-13

## 2022-01-13 NOTE — LETTER
[address]  [facility] [address] [facility] [address]  Phone: [phone]  Fax: [phone]      January 18, 2022     [address] [facility]  [address]      Dear Barrera Saeed: This letter is to inform you that we have been trying to reach you to schedule with our urologist for evaluation of your abscess. Please call the office to let us know your plans for treatment and to schedule your appointment.      Sincerely,        Pari Shore MD

## 2022-01-13 NOTE — TELEPHONE ENCOUNTER
----- Message from Elidia Bennett PA-C sent at 1/12/2022  9:58 AM EST -----  Regarding: [facility] follow up  Hello,    This patient was seen in ED for I&D of penile abscess. He needs follow up in ~2 weeks to ensure abscess is treated with antibiotics he was prescribed. He can see any of the [facility] doctors. Thank you!   Radha